# Patient Record
(demographics unavailable — no encounter records)

---

## 2025-02-26 NOTE — HISTORY OF PRESENT ILLNESS
[FreeTextEntry1] : 48F Persian-speaking h/o migraine headache and hypothyroid, planning for EGD had outside EKG done found with T-wave inversion in V1-V3, presents for preprocedural cardiac risk evaluation.   Reports having globus sensation in her throat with irritation and dyspepsia hence pending EGD, denies prior EKG done with her PCP, at times with chest discomfort infrequently at rest during stress or feeling anxious, no routine exercise but walk constantly in her work. Had prior colonoscopy in 2022. Last blood work with PCP told with prediabetes but normal cholesterol.   GI Dr. Pedrito Bernard at [place]   No CAD/stroke in family  Nonsmoker, no alcohol use  Working in medicine packaging

## 2025-02-26 NOTE — PHYSICAL EXAM

## 2025-02-26 NOTE — DISCUSSION/SUMMARY
[FreeTextEntry1] : 48F Kyrgyz-speaking h/o migraine headache and hypothyroid, planning for EGD had outside EKG done found with T-wave inversion in V1-V3, presents for preprocedural cardiac risk evaluation.   Repeat EKG with unchanged precordial T-wave inversion but given with intermittent chest discomfort will obtain exercise treadmill stress test and also Echocardiogram. If results are normal then no cardiac contraindication for EGD under anesthesia.   Overweight- needs dieting and exercise for weight loss   Follow up as needed if new cardiac issue arise.  [EKG obtained to assist in diagnosis and management of assessed problem(s)] : EKG obtained to assist in diagnosis and management of assessed problem(s)

## 2025-07-16 NOTE — HISTORY OF PRESENT ILLNESS
[FreeTextEntry1] : 48 year old f   presents today for evaluation of headaches. she reports having headaches for the past 5yrs Location: left sided, occipital  described as pressure    Associated symptoms: photophobia, phonophobia, nausea, occasional. vomiting,  Aggravated Factors: none Relived by: dark room, rest,  OTC not effective Severity:  8 Occurs;1-2x/ month. has reg dull HA 4x/week  Duration:<4hrs has neck pain   Sleeps:  uninterrupted 6hrs Hydration: water: 6 cups  caffeine: 1 cup Triggers: stress  **Interval 7.16.25:  Patient presents for follow-up visit was seen a year ago but did not have insurance here today for same complaint of headaches.  went to the ED in September. Head CT reported normal.  she was  taking magnesium, but  no longer taking  Has neck pain that radiates to top of head.

## 2025-07-16 NOTE — PHYSICAL EXAM
[Person] : oriented to person [Place] : oriented to place [Time] : oriented to time [Short Term Intact] : short term memory intact [Current Events] : adequate knowledge of current events [Cranial Nerves Optic (II)] : visual acuity intact bilaterally,  visual fields full to confrontation, pupils equal round and reactive to light [Cranial Nerves Oculomotor (III)] : extraocular motion intact [Cranial Nerves Trigeminal (V)] : facial sensation intact symmetrically [Cranial Nerves Facial (VII)] : face symmetrical [Cranial Nerves Vestibulocochlear (VIII)] : hearing was intact bilaterally [Cranial Nerves Glossopharyngeal (IX)] : tongue and palate midline [Cranial Nerves Accessory (XI - Cranial And Spinal)] : head turning and shoulder shrug symmetric [Cranial Nerves Hypoglossal (XII)] : there was no tongue deviation with protrusion [Motor Strength] : muscle strength was normal in all four extremities [Motor Handedness Right-Handed] : the patient is right hand dominant [2+] : Patella left 2+ [General Appearance - In No Acute Distress] : in no acute distress [Affect] : the affect was normal [Mood] : the mood was normal [FreeTextEntry1] : b/l trap msk ttp, restricted rom in neck  occip.notch ttp

## 2025-07-16 NOTE — REASON FOR VISIT
[Follow-Up: _____] : a [unfilled] follow-up visit [Language Line ] : provided by Language Line   [Interpreters_IDNumber] : [id number] [TWNoteComboBox1] : Israeli

## 2025-07-30 NOTE — REASON FOR VISIT
[Initial Visit] : an initial visit for [Knee Pain] : knee pain [Family Member] : family member [Other: _____] : [unfilled] [Language Line ] : provided by Language Line   [FreeTextEntry2] : right knee pain  [Interpreters_IDNumber] : [id number] [TWNoteComboBox1] : Syrian

## 2025-07-30 NOTE — PHYSICAL EXAM
[de-identified] : General: Awake, alert, no acute distress, Patient was cooperative and appropriate during the examination.  The patient is of normal weight for height and age.  Walks without an antalgic gait.  Right knee Examination: Physical examination of the knee demonstrates normal skin without signs of skin changes or abnormalities. No erythema, warmth, or joint effusion is appreciated .   Sensation is intact to light touch L2-S1 Palpable DP/PT pulse EHL/FHL/TA/GSC motor function intact   Range of Motion 0-130 degrees   Strength Testing Quadriceps/Hamstring 5/5 Patient is able to perform a straight leg raise without difficulty.   Palpation Not tender to palpation about the distal femur, proximal tibia, or patella No palpable defect appreciated in the quadriceps or patellar tendons Minimal tender to palpation of medial joint line Minimal tender to palpation of lateral joint line   Special Tests Anterior Drawer negative Posterior Drawer negative Lachman Exam negative No Varus or Valgus Laxity at 0 or 30 degrees of knee flexion Michael's Test negative for pain or crepitus Active compression of the patella negative for pain or crepitus Translation of the patella 2 quadrants with a firm endpoint [de-identified] : X-rays 4 views of the right knee taken in the office on 7/30/2025 shows no acute fracture or dislocation with moderate osteoarthritic changes noted.
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
## 2025-07-30 NOTE — REASON FOR VISIT
[Initial Visit] : an initial visit for [Knee Pain] : knee pain [Family Member] : family member [Other: _____] : [unfilled] [Language Line ] : provided by Language Line   [FreeTextEntry2] : right knee pain  [Interpreters_IDNumber] : [id number] [TWNoteComboBox1] : Cymro

## 2025-07-30 NOTE — DISCUSSION/SUMMARY
[de-identified] : Assessment: Patient is a 49-year-old female with right knee arthroscopy back in 2022 and developing arthritis  Plan: I had a long discussion with the patient today regarding the nature of their diagnosis and treatment plan. We discussed the risks and benefits of no treatment as well as nonoperative and operative treatments.  I reviewed the x-ray today that showed no acute bony pathology.  On examination today there is little pain with good range of motion and strength.  At this time I recommend conservative treatment of the patient's condition with modalities including rest, ice, heat, anti-inflammatory medications, activity modifications, and home stretching and strengthening exercises.  A prescription for diclofenac was sent to the patient's pharmacy today.  I discussed with the patient the risks and benefits associated with NSAID use. GI precautions were discussed.  A referral for physical therapy was provided to begin working on exercises to help improve their strength and function.  Recommend follow-up in 8 weeks repeat clinical assessment as needed.  If symptoms were to persist we may consider MRI versus injection in the future.   The patient verbalizes their understanding and agrees with the plan.  All questions were answered to their satisfaction.   I, Dr. Christy, personally performed the evaluation and management (E/M) services for this new patient.  That E/M includes conducting the clinically appropriate initial history &/or exam, assessing all conditions, and establishing the plan of care.  Today, my SHAJI, was here to observe my evaluation and management service for this patient & follow plan of care established by me going forward.

## 2025-07-30 NOTE — PHYSICAL EXAM
[de-identified] : General: Awake, alert, no acute distress, Patient was cooperative and appropriate during the examination.  The patient is of normal weight for height and age.  Walks without an antalgic gait.  Right knee Examination: Physical examination of the knee demonstrates normal skin without signs of skin changes or abnormalities. No erythema, warmth, or joint effusion is appreciated .   Sensation is intact to light touch L2-S1 Palpable DP/PT pulse EHL/FHL/TA/GSC motor function intact   Range of Motion 0-130 degrees   Strength Testing Quadriceps/Hamstring 5/5 Patient is able to perform a straight leg raise without difficulty.   Palpation Not tender to palpation about the distal femur, proximal tibia, or patella No palpable defect appreciated in the quadriceps or patellar tendons Minimal tender to palpation of medial joint line Minimal tender to palpation of lateral joint line   Special Tests Anterior Drawer negative Posterior Drawer negative Lachman Exam negative No Varus or Valgus Laxity at 0 or 30 degrees of knee flexion Michael's Test negative for pain or crepitus Active compression of the patella negative for pain or crepitus Translation of the patella 2 quadrants with a firm endpoint [de-identified] : X-rays 4 views of the right knee taken in the office on 7/30/2025 shows no acute fracture or dislocation with moderate osteoarthritic changes noted.

## 2025-07-30 NOTE — DISCUSSION/SUMMARY
[de-identified] : Assessment: Patient is a 49-year-old female with right knee arthroscopy back in 2022 and developing arthritis  Plan: I had a long discussion with the patient today regarding the nature of their diagnosis and treatment plan. We discussed the risks and benefits of no treatment as well as nonoperative and operative treatments.  I reviewed the x-ray today that showed no acute bony pathology.  On examination today there is little pain with good range of motion and strength.  At this time I recommend conservative treatment of the patient's condition with modalities including rest, ice, heat, anti-inflammatory medications, activity modifications, and home stretching and strengthening exercises.  A prescription for diclofenac was sent to the patient's pharmacy today.  I discussed with the patient the risks and benefits associated with NSAID use. GI precautions were discussed.  A referral for physical therapy was provided to begin working on exercises to help improve their strength and function.  Recommend follow-up in 8 weeks repeat clinical assessment as needed.  If symptoms were to persist we may consider MRI versus injection in the future.   The patient verbalizes their understanding and agrees with the plan.  All questions were answered to their satisfaction.   I, Dr. Christy, personally performed the evaluation and management (E/M) services for this new patient.  That E/M includes conducting the clinically appropriate initial history &/or exam, assessing all conditions, and establishing the plan of care.  Today, my SHAJI, was here to observe my evaluation and management service for this patient & follow plan of care established by me going forward.